# Patient Record
Sex: FEMALE | Race: WHITE
[De-identification: names, ages, dates, MRNs, and addresses within clinical notes are randomized per-mention and may not be internally consistent; named-entity substitution may affect disease eponyms.]

---

## 2020-06-25 NOTE — ED
Abdominal Pain HPI





- General


Chief Complaint: Abdominal Pain


Stated Complaint: vaginal pain


Time Seen by Provider: 20 17:13


Source: patient


Mode of arrival: wheelchair


Limitations: physical limitation





- History of Present Illness


Initial Comments: 





Patient is a 44-year-old female, with history of hypertension, presenting to the

emergency Department with complaints of lower abdominal cramping as well as 

vaginal bleeding.  Patient states she went to [facility] 

yesterday and was evaluated for heavy vaginal bleeding.  They did perform an 

ultrasound and blood work and showed no acute findings.  She was referred to 

OB/GYN.  Patient states the bleeding has seemed to decrease but she is having 

some lower abdominal cramping.  She states she does get cramping with her normal

menstrual cycles.  She states she was not sure exactly what was seen on 

ultrasound yesterday was concerned she might have some cysts.  She denies any 

fever, chills.  She denies being pregnant this time secondary a tubal ligation. 

She denies any chest pain or shortness of breath.  She has no further complaints

at this time.  Upon arrival to the ER her vital signs are stable.





- Related Data


                                Home Medications











 Medication  Instructions  Recorded  Confirmed


 


Citalopram Hydrobromide 40 mg PO DAILY 20





[Citalopram HBr]   


 


Cyclobenzaprine [Flexeril] 10 mg PO DAILY PRN 20


 


Ibuprofen 800 mg PO Q8H PRN 20


 


Lisinopril-Hctz 20-12.5 mg 1 tab PO DAILY 20





[Zestoretic 20-12.5]   








                                  Previous Rx's











 Medication  Instructions  Recorded


 


Ketorolac [Toradol] 10 mg PO Q8HR #10 tab 20











                                    Allergies











Allergy/AdvReac Type Severity Reaction Status Date / Time


 


amoxicillin Allergy  Rash/Hives Verified 20 18:26


 


Penicillins Allergy  Rash/Hives Verified 20 18:26


 


strawberry Allergy  Swelling Verified 20 18:26














Review of Systems


ROS Statement: 


Those systems with pertinent positive or pertinent negative responses have been 

documented in the HPI.





ROS Other: All systems not noted in ROS Statement are negative.





Past Medical History


Past Medical History: Hypertension


Additional Past Medical History / Comment(s): brain shunt - pseudo tumor


History of Any Multi-Drug Resistant Organisms: MRSA


Date of last positivie culture/infection: 


MDRO Source:: rt axilla


Past Surgical History:  Section, Cholecystectomy, Tubal Ligation


Additional Past Surgical History / Comment(s): brain - tumor


Past Psychological History: No Psychological Hx Reported


Smoking Status: Current every day smoker


Past Alcohol Use History: None Reported


Past Drug Use History: None Reported





General Exam





- General Exam Comments


Initial Comments: 





GENERAL: 


Well-appearing, well-nourished and in no acute distress.





HEAD: 


Atraumatic, normocephalic.





EYES:


Pupils equal round and reactive to light, extraocular movements intact, sclera 

anicteric, conjunctiva are normal.





ENT: 


TMs normal, nares patent, oropharynx clear without exudates.  Moist mucous 

membranes.





NECK: 


Normal range of motion, supple without lymphadenopathy or JVD.





LUNGS:


 Breath sounds clear to auscultation bilaterally and equal.  No wheezes rales or

 rhonchi.





HEART:


Regular rate and rhythm without murmurs, rubs or gallops.





ABDOMEN: 


Mild suprapubic tenderness on palpation.  No other abdominal pain.  Soft, 

normoactive bowel sounds.  No guarding, no rebound.  No masses appreciated.





: Deferred 





EXTREMITIES: 


Normal range of motion, no pitting or edema.  No clubbing or cyanosis.





NEUROLOGICAL: 


Cranial nerves II through XII grossly intact.  Normal speech, normal gait.





PSYCH:


Normal mood, normal affect.





SKIN:


 Warm, Dry, normal turgor, no rashes or lesions noted.


Limitations: physical limitation





Course


                                   Vital Signs











  20





  16:56 17:00 18:00


 


Temperature 98.9 F  


 


Pulse Rate 77  


 


Respiratory 18 20 20





Rate   


 


Blood Pressure 156/73  


 


O2 Sat by Pulse 96  





Oximetry   














  20





  19:19


 


Temperature 


 


Pulse Rate 73


 


Respiratory 18





Rate 


 


Blood Pressure 150/87


 


O2 Sat by Pulse 97





Oximetry 














Medical Decision Making





- Medical Decision Making





Patient is a 44-year-old female presenting for vaginal bleeding as well as 

suprapubic cramping 2 days.  Patient was seen at [facility] 

yesterday for same complaint.  They did do an ultrasound which showed no acute 

findings as well as lab work and a urine also showed no acute findings.  I did 

obtain these records and they have been reviewed.  Patient's vital signs today 

are stable.  Her exam just shows some very mild suprapubic tenderness on 

palpation.  She states her bleeding has improved today.  I did repeat her lab 

work and her hemoglobin is stable, no other acute findings.  Her urine showed no

 signs of infection at this time, hCG is not detected.  I did give patient 

Toradol which improved her symptoms.  I discussed these findings with the 

patient.  I recommended follow-up with OB/GYN.  This is most likely d

ysfunctional bleeding.  Patient is in agreement with this plan of care.  Return 

parameters were discussed with the patient she verbalized understanding.  Case 

discussed with Dr. Major. 





- Lab Data


Result diagrams: 


                                 20 17:43





                                 20 17:43


                                   Lab Results











  20 Range/Units





  17:43 17:43 18:13 


 


WBC  7.8    (3.8-10.6)  k/uL


 


RBC  4.27    (3.80-5.40)  m/uL


 


Hgb  11.8    (11.4-16.0)  gm/dL


 


Hct  36.0    (34.0-46.0)  %


 


MCV  84.4    (80.0-100.0)  fL


 


MCH  27.5    (25.0-35.0)  pg


 


MCHC  32.6    (31.0-37.0)  g/dL


 


RDW  16.7 H    (11.5-15.5)  %


 


Plt Count  210    (150-450)  k/uL


 


Neutrophils %  67    %


 


Lymphocytes %  21    %


 


Monocytes %  7    %


 


Eosinophils %  4    %


 


Basophils %  0    %


 


Neutrophils #  5.2    (1.3-7.7)  k/uL


 


Lymphocytes #  1.6    (1.0-4.8)  k/uL


 


Monocytes #  0.5    (0-1.0)  k/uL


 


Eosinophils #  0.3    (0-0.7)  k/uL


 


Basophils #  0.0    (0-0.2)  k/uL


 


Anisocytosis  Slight    


 


Sodium   138   (137-145)  mmol/L


 


Potassium   3.7   (3.5-5.1)  mmol/L


 


Chloride   106   ()  mmol/L


 


Carbon Dioxide   27   (22-30)  mmol/L


 


Anion Gap   5   mmol/L


 


BUN   12   (7-17)  mg/dL


 


Creatinine   0.60   (0.52-1.04)  mg/dL


 


Est GFR (CKD-EPI)AfAm   >90   (>60 ml/min/1.73 sqM)  


 


Est GFR (CKD-EPI)NonAf   >90   (>60 ml/min/1.73 sqM)  


 


Glucose   110 H   (74-99)  mg/dL


 


Calcium   9.0   (8.4-10.2)  mg/dL


 


Total Bilirubin   0.2   (0.2-1.3)  mg/dL


 


AST   20   (14-36)  U/L


 


ALT   13   (4-34)  U/L


 


Alkaline Phosphatase   71   ()  U/L


 


Total Protein   6.3   (6.3-8.2)  g/dL


 


Albumin   3.6   (3.5-5.0)  g/dL


 


Urine Color    Light Red  


 


Urine Appearance    Clear  (Clear)  


 


Urine pH    5.5  (5.0-8.0)  


 


Ur Specific Gravity    1.017  (1.001-1.035)  


 


Urine Protein    Trace H  (Negative)  


 


Urine Glucose (UA)    Negative  (Negative)  


 


Urine Ketones    Negative  (Negative)  


 


Urine Blood    Large H  (Negative)  


 


Urine Nitrite    Negative  (Negative)  


 


Urine Bilirubin    Negative  (Negative)  


 


Urine Urobilinogen    <2.0  (<2.0)  mg/dL


 


Ur Leukocyte Esterase    Small H  (Negative)  


 


Urine RBC    >182 H  (0-5)  /hpf


 


Urine WBC    38 H  (0-5)  /hpf


 


Urine Mucus    Rare H  (None)  /hpf


 


Urine HCG, Qual     (Not Detectd)  














  20 Range/Units





  18:13 


 


WBC   (3.8-10.6)  k/uL


 


RBC   (3.80-5.40)  m/uL


 


Hgb   (11.4-16.0)  gm/dL


 


Hct   (34.0-46.0)  %


 


MCV   (80.0-100.0)  fL


 


MCH   (25.0-35.0)  pg


 


MCHC   (31.0-37.0)  g/dL


 


RDW   (11.5-15.5)  %


 


Plt Count   (150-450)  k/uL


 


Neutrophils %   %


 


Lymphocytes %   %


 


Monocytes %   %


 


Eosinophils %   %


 


Basophils %   %


 


Neutrophils #   (1.3-7.7)  k/uL


 


Lymphocytes #   (1.0-4.8)  k/uL


 


Monocytes #   (0-1.0)  k/uL


 


Eosinophils #   (0-0.7)  k/uL


 


Basophils #   (0-0.2)  k/uL


 


Anisocytosis   


 


Sodium   (137-145)  mmol/L


 


Potassium   (3.5-5.1)  mmol/L


 


Chloride   ()  mmol/L


 


Carbon Dioxide   (22-30)  mmol/L


 


Anion Gap   mmol/L


 


BUN   (7-17)  mg/dL


 


Creatinine   (0.52-1.04)  mg/dL


 


Est GFR (CKD-EPI)AfAm   (>60 ml/min/1.73 sqM)  


 


Est GFR (CKD-EPI)NonAf   (>60 ml/min/1.73 sqM)  


 


Glucose   (74-99)  mg/dL


 


Calcium   (8.4-10.2)  mg/dL


 


Total Bilirubin   (0.2-1.3)  mg/dL


 


AST   (14-36)  U/L


 


ALT   (4-34)  U/L


 


Alkaline Phosphatase   ()  U/L


 


Total Protein   (6.3-8.2)  g/dL


 


Albumin   (3.5-5.0)  g/dL


 


Urine Color   


 


Urine Appearance   (Clear)  


 


Urine pH   (5.0-8.0)  


 


Ur Specific Gravity   (1.001-1.035)  


 


Urine Protein   (Negative)  


 


Urine Glucose (UA)   (Negative)  


 


Urine Ketones   (Negative)  


 


Urine Blood   (Negative)  


 


Urine Nitrite   (Negative)  


 


Urine Bilirubin   (Negative)  


 


Urine Urobilinogen   (<2.0)  mg/dL


 


Ur Leukocyte Esterase   (Negative)  


 


Urine RBC   (0-5)  /hpf


 


Urine WBC   (0-5)  /hpf


 


Urine Mucus   (None)  /hpf


 


Urine HCG, Qual  Not Detected  (Not Detectd)  














Disposition


Clinical Impression: 


 Dysfunctional uterine bleeding





Disposition: HOME SELF-CARE


Condition: Stable


Instructions (If sedation given, give patient instructions):  Dysfunctional Uter

ine Bleeding (ED)


Additional Instructions: 


Please return to the Emergency Department if symptoms worsen or any other 

concerns.


Follow-up with OB/GYN as discussed.  Trial of Toradol for discomfort.


Prescriptions: 


Ketorolac [Toradol] 10 mg PO Q8HR #10 tab


Is patient prescribed a controlled substance at d/c from ED?: No


Referrals: 


Sasha Benjamin MD [Primary Care Provider] - 1-2 days

## 2020-09-29 NOTE — P.GSHP
History of Present Illness


H&P Date: 20


Chief Complaint: Incisional hernia





This a 45-year-old female who's developed an incisional hernia at a previous 

periumbilical incision.  Patient developed a tender mass in the area.  She is 

presumed have incarcerated omentum within the mass.  She presents today for 

laparoscopic robotic-assisted repair.





Past Medical History


Past Medical History: COPD, CVA/TIA, Hypertension, Pneumonia, Sleep 

Apnea/CPAP/BIPAP


Additional Past Medical History / Comment(s): has brain shunt - pseudo tumor, 

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

pneumonia in 2020,uses CPAP, 2 TIA's in past-no residual effects, thinks 

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


History of Any Multi-Drug Resistant Organisms: MRSA


Date of last positivie culture/infection: 


MDRO Source:: rt axilla


Past Surgical History:  Section, Cholecystectomy, Tubal Ligation


Additional Past Surgical History / Comment(s):  shunt, colonoscopy, EGD


Past Anesthesia/Blood Transfusion Reactions: No Reported Reaction


Smoking Status: Current every day smoker





- Past Family History


  ** Mother


Family Medical History: Cancer





Medications and Allergies


                                Home Medications











 Medication  Instructions  Recorded  Confirmed  Type


 


Citalopram Hydrobromide 40 mg PO DAILY 20 History





[Citalopram HBr]    


 


Ibuprofen 800 mg PO Q8H PRN 20 History


 


Lisinopril-Hctz 20-12.5 mg 1 tab PO BID 20 History





[Zestoretic 20-12.5]    


 


Albuterol Inhaler [Ventolin Hfa 2 puff INHALATION RT-QID PRN 20 

History





Inhaler]    


 


Nicotine 14Mg/24Hr Patch [Habitrol 1 patch TRANSDERM DAILY 20 

History





14Mg/24Hr Patch]    








                                    Allergies











Allergy/AdvReac Type Severity Reaction Status Date / Time


 


amoxicillin Allergy  Rash/Hives Verified 20 07:59


 


bee venom protein (honey bee) Allergy  Anaphylaxis Verified 20 07:59


 


Penicillins Allergy  Rash/Hives Verified 20 07:59


 


strawberry Allergy  Swelling Verified 20 07:59














Surgical - Exam


                                   Vital Signs











Temp Pulse Resp BP Pulse Ox


 


 97.5 F L  80   16   140/61   94 L


 


 20 08:15  20 08:15  20 08:15  20 08:15  20 08:15














- General


well developed, well nourished, no distress





- Eyes


PERRL





- ENT


normal pinna





- Neck


no masses





- Respiratory


normal expansion





- Cardiovascular


Rhythm: regular





- Abdomen


Abdomen: soft, non tender


Hernia: incisional (4 cm incarcerated incisional hernia in the infra umbilical 

position)





Assessment and Plan


Assessment: 





Incisional hernia.  We'll perform laparoscopic robotic-assisted repair.

## 2021-07-23 ENCOUNTER — HOSPITAL ENCOUNTER (OUTPATIENT)
Dept: HOSPITAL 47 - RADCTMAIN | Age: 46
Discharge: HOME | End: 2021-07-23
Attending: RADIOLOGY
Payer: MEDICARE

## 2021-07-23 DIAGNOSIS — C75.4: Primary | ICD-10-CM

## 2021-07-23 DIAGNOSIS — Z92.3: ICD-10-CM

## 2021-07-23 DIAGNOSIS — D44.7: ICD-10-CM

## 2021-07-23 PROCEDURE — 70450 CT HEAD/BRAIN W/O DYE: CPT

## 2021-07-25 ENCOUNTER — HOSPITAL ENCOUNTER (EMERGENCY)
Dept: HOSPITAL 47 - EC | Age: 46
Discharge: HOME | End: 2021-07-25
Payer: MEDICARE

## 2021-07-25 VITALS — DIASTOLIC BLOOD PRESSURE: 86 MMHG | SYSTOLIC BLOOD PRESSURE: 190 MMHG | HEART RATE: 74 BPM | RESPIRATION RATE: 20 BRPM

## 2021-07-25 VITALS — TEMPERATURE: 98.3 F

## 2021-07-25 DIAGNOSIS — Z88.0: ICD-10-CM

## 2021-07-25 DIAGNOSIS — J44.9: ICD-10-CM

## 2021-07-25 DIAGNOSIS — M25.50: ICD-10-CM

## 2021-07-25 DIAGNOSIS — Z91.018: ICD-10-CM

## 2021-07-25 DIAGNOSIS — R53.83: ICD-10-CM

## 2021-07-25 DIAGNOSIS — Z79.899: ICD-10-CM

## 2021-07-25 DIAGNOSIS — F17.200: ICD-10-CM

## 2021-07-25 DIAGNOSIS — Z91.030: ICD-10-CM

## 2021-07-25 DIAGNOSIS — R53.1: Primary | ICD-10-CM

## 2021-07-25 DIAGNOSIS — I10: ICD-10-CM

## 2021-07-25 LAB
ALBUMIN SERPL-MCNC: 3.5 G/DL (ref 3.5–5)
ALP SERPL-CCNC: 76 U/L (ref 38–126)
ALT SERPL-CCNC: 17 U/L (ref 4–34)
ANION GAP SERPL CALC-SCNC: 4 MMOL/L
APTT BLD: 20.7 SEC (ref 22–30)
AST SERPL-CCNC: 29 U/L (ref 14–36)
BASOPHILS # BLD AUTO: 0 K/UL (ref 0–0.2)
BASOPHILS NFR BLD AUTO: 0 %
BUN SERPL-SCNC: 11 MG/DL (ref 7–17)
CALCIUM SPEC-MCNC: 8.9 MG/DL (ref 8.4–10.2)
CHLORIDE SERPL-SCNC: 104 MMOL/L (ref 98–107)
CO2 SERPL-SCNC: 30 MMOL/L (ref 22–30)
EOSINOPHIL # BLD AUTO: 0.2 K/UL (ref 0–0.7)
EOSINOPHIL NFR BLD AUTO: 4 %
ERYTHROCYTE [DISTWIDTH] IN BLOOD BY AUTOMATED COUNT: 4.07 M/UL (ref 3.8–5.4)
ERYTHROCYTE [DISTWIDTH] IN BLOOD: 16.6 % (ref 11.5–15.5)
GLUCOSE SERPL-MCNC: 173 MG/DL (ref 74–99)
HCT VFR BLD AUTO: 32.7 % (ref 34–46)
HGB BLD-MCNC: 10.7 GM/DL (ref 11.4–16)
INR PPP: 0.9 (ref ?–1.2)
LYMPHOCYTES # SPEC AUTO: 1.2 K/UL (ref 1–4.8)
LYMPHOCYTES NFR SPEC AUTO: 18 %
MCH RBC QN AUTO: 26.2 PG (ref 25–35)
MCHC RBC AUTO-ENTMCNC: 32.6 G/DL (ref 31–37)
MCV RBC AUTO: 80.3 FL (ref 80–100)
MONOCYTES # BLD AUTO: 0.4 K/UL (ref 0–1)
MONOCYTES NFR BLD AUTO: 6 %
NEUTROPHILS # BLD AUTO: 4.5 K/UL (ref 1.3–7.7)
NEUTROPHILS NFR BLD AUTO: 70 %
PH UR: 5.5 [PH] (ref 5–8)
PLATELET # BLD AUTO: 219 K/UL (ref 150–450)
POTASSIUM SERPL-SCNC: 4 MMOL/L (ref 3.5–5.1)
PROT SERPL-MCNC: 6 G/DL (ref 6.3–8.2)
PT BLD: 9.5 SEC (ref 9–12)
RBC UR QL: 1 /HPF (ref 0–5)
SODIUM SERPL-SCNC: 138 MMOL/L (ref 137–145)
SP GR UR: 1.01 (ref 1–1.03)
SQUAMOUS UR QL AUTO: 1 /HPF (ref 0–4)
UROBILINOGEN UR QL STRIP: <2 MG/DL (ref ?–2)
WBC # BLD AUTO: 6.4 K/UL (ref 3.8–10.6)
WBC # UR AUTO: 3 /HPF (ref 0–5)

## 2021-07-25 PROCEDURE — 81001 URINALYSIS AUTO W/SCOPE: CPT

## 2021-07-25 PROCEDURE — 85730 THROMBOPLASTIN TIME PARTIAL: CPT

## 2021-07-25 PROCEDURE — 83605 ASSAY OF LACTIC ACID: CPT

## 2021-07-25 PROCEDURE — 36415 COLL VENOUS BLD VENIPUNCTURE: CPT

## 2021-07-25 PROCEDURE — 96360 HYDRATION IV INFUSION INIT: CPT

## 2021-07-25 PROCEDURE — 85610 PROTHROMBIN TIME: CPT

## 2021-07-25 PROCEDURE — 99285 EMERGENCY DEPT VISIT HI MDM: CPT

## 2021-07-25 PROCEDURE — 85025 COMPLETE CBC W/AUTO DIFF WBC: CPT

## 2021-07-25 PROCEDURE — 80053 COMPREHEN METABOLIC PANEL: CPT

## 2021-07-25 PROCEDURE — 84484 ASSAY OF TROPONIN QUANT: CPT

## 2021-07-25 PROCEDURE — 93005 ELECTROCARDIOGRAM TRACING: CPT

## 2021-07-25 PROCEDURE — 71046 X-RAY EXAM CHEST 2 VIEWS: CPT

## 2021-07-25 NOTE — XR
EXAMINATION TYPE: XR chest 2V

 

DATE OF EXAM: 7/25/2021

 

COMPARISON: 4/1/2013

 

HISTORY: Cough

 

TECHNIQUE: 2 views

 

FINDINGS: Heart and mediastinum are normal. Lungs are clear. Diaphragm is normal. There is right jugu
lar catheter with tip in the superior vena cava.

 

IMPRESSION: No active cardiopulmonary disease. Normal heart. No change.

## 2021-07-25 NOTE — CT
EXAMINATION TYPE: CT brain wo con

 

DATE OF EXAM: 7/23/2021

 

COMPARISON: None

 

HISTORY: checking for shunt placement

 

CT DLP: 1133.3 mGycm

 

Unenhanced CT of the brain was performed.  

 

The basal cisterns and sulci overlying the cerebral convexities demonstrate a normal appearance.  Rig
ht frontal shunt catheter is noted with its distal tip within the left lateral ventricle. The left la
teral ventricle is larger than its right-sided counterpart.

 

There is no evidence for intracranial hemorrhage or sulcal effacement.  

 

No mass effects are seen.  

 

Osseous calvarium is intact.

 

If symptoms persist consider MRI as clinically warranted.  

 

IMPRESSION:

 

1.  Right frontal shunt catheter is noted with its distal tip within the left lateral ventricle. The 
left lateral ventricle is larger than its right-sided counterpart.

## 2021-07-25 NOTE — ED
General Adult HPI





- General


Chief complaint: Weakness


Stated complaint: Weakness


Time Seen by Provider: 21 15:46


Source: patient, RN notes reviewed


Mode of arrival: ambulatory





- History of Present Illness


Initial comments: 





Patient is a 46-year-old female that presents to the emergency department 

complaining of generalized not feeling well for the last 2 weeks.  She notes 

that all of her joints feel achy.  She denied any current pain just not feeling 

well.  She was otherwise a well-appearing 46-year-old female in no apparent 

distress or pain.  She notes that she might be prediabetic.  She denied any 

other health comorbidities.  She denied any chest pain shortness of breath 

headache nausea vomiting diarrhea constipation fever fatigue chills.





- Related Data


                                Home Medications











 Medication  Instructions  Recorded  Confirmed


 


Citalopram Hydrobromide 40 mg PO DAILY 20





[Citalopram HBr]   


 


Ibuprofen 800 mg PO Q8H PRN 20


 


Lisinopril-Hctz 20-12.5 mg 1 tab PO BID 20





[Zestoretic 20-12.5]   


 


Albuterol Inhaler [Ventolin Hfa 2 puff INHALATION RT-QID PRN 20





Inhaler]   


 


Cyanocobalamin (Vitamin B-12) 1,000 mcg PO DAILY 21





[Vitamin B-12]   


 


Ergocalciferol [Vitamin D2 (1250 1,250 mcg PO Q14D 21





Mcg = 40106 Iu)]   


 


Metoprolol Tartrate [Lopressor] 25 mg PO DAILY@1500 21


 


Pantoprazole Sodium [Protonix] 40 mg PO DAILY PRN 21


 


traMADol HCL 50 - 100 mg PO TID PRN 21











                                    Allergies











Allergy/AdvReac Type Severity Reaction Status Date / Time


 


amoxicillin Allergy  Anaphylaxis Verified 21 17:18


 


bee venom protein (honey bee) Allergy  Anaphylaxis Verified 21 17:18


 


Penicillins Allergy  Anaphylaxis Verified 21 17:18


 


strawberry Allergy  Anaphylaxis Verified 21 17:18














Review of Systems


ROS Statement: 


Those systems with pertinent positive or pertinent negative responses have been 

documented in the HPI.





ROS Other: All systems not noted in ROS Statement are negative.





Past Medical History


Past Medical History: COPD, CVA/TIA, Hypertension, Pneumonia, Sleep 

Apnea/CPAP/BIPAP


Additional Past Medical History / Comment(s): has brain shunt - pseudo tumor, 

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

 pneumonia in 2020,uses CPAP, 2 TIA's in past-no residual effects, thinks 

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


History of Any Multi-Drug Resistant Organisms: MRSA


Date of last positivie culture/infection: 


MDRO Source:: rt axilla


Past Surgical History:  Section, Cholecystectomy, Tubal Ligation


Additional Past Surgical History / Comment(s):  shunt, colonoscopy, EGD


Past Anesthesia/Blood Transfusion Reactions: No Reported Reaction


Past Psychological History: Anxiety, Depression


Smoking Status: Current every day smoker


Past Alcohol Use History: Rare


Past Drug Use History: None Reported





- Past Family History


  ** Mother


Family Medical History: Cancer





General Exam


Limitations: no limitations


General appearance: alert, in no apparent distress, obese (Morbidly)


Head exam: Present: atraumatic, normocephalic, normal inspection


Eye exam: Present: normal appearance, PERRL, EOMI.  Absent: scleral icterus, 

conjunctival injection, periorbital swelling


Neck exam: Present: normal inspection


Respiratory exam: Present: normal lung sounds bilaterally.  Absent: respiratory 

distress, wheezes, rales, rhonchi, stridor


Cardiovascular Exam: Present: regular rate, normal rhythm, normal heart sounds. 

 Absent: systolic murmur, diastolic murmur, rubs, gallop, clicks


GI/Abdominal exam: Present: soft, normal bowel sounds.  Absent: distended, 

tenderness, guarding, rebound, rigid


Extremities exam: Present: normal inspection, full ROM, normal capillary refill.

  Absent: tenderness, pedal edema, joint swelling, calf tenderness


Neurological exam: Present: alert, oriented X3


Psychiatric exam: Present: normal affect, normal mood


Skin exam: Present: warm, dry, intact, normal color.  Absent: rash





Course


                                   Vital Signs











  21





  15:39 17:21


 


Temperature 98.3 F 


 


Pulse Rate 80 74


 


Respiratory 18 20





Rate  


 


Blood Pressure 191/82 190/86


 


O2 Sat by Pulse 94 L 94 L





Oximetry  














EKG Findings





- EKG Comments:


EKG Findings:: Ventricular rate 77 bpm, ME interval 166 ms, QRS duration 100 ms,

  ms, PRT axes 46/39/48.  Normal sinus rhythm.





Medical Decision Making





- Medical Decision Making





Patient is a 46-year-old female who presents for a 2 week history of not feeling

 well and feeling fatigued.


Labs, EKG, cardiac monitor, chest x-ray, 1 L normal saline ordered.





- Lab Data


Result diagrams: 


                                 21 16:26





                                 21 16:26


                                   Lab Results











  21 Range/Units





  16:26 16:26 16:26 


 


WBC  6.4    (3.8-10.6)  k/uL


 


RBC  4.07    (3.80-5.40)  m/uL


 


Hgb  10.7 L    (11.4-16.0)  gm/dL


 


Hct  32.7 L    (34.0-46.0)  %


 


MCV  80.3    (80.0-100.0)  fL


 


MCH  26.2    (25.0-35.0)  pg


 


MCHC  32.6    (31.0-37.0)  g/dL


 


RDW  16.6 H    (11.5-15.5)  %


 


Plt Count  219    (150-450)  k/uL


 


MPV  8.9    


 


Neutrophils %  70    %


 


Lymphocytes %  18    %


 


Monocytes %  6    %


 


Eosinophils %  4    %


 


Basophils %  0    %


 


Neutrophils #  4.5    (1.3-7.7)  k/uL


 


Lymphocytes #  1.2    (1.0-4.8)  k/uL


 


Monocytes #  0.4    (0-1.0)  k/uL


 


Eosinophils #  0.2    (0-0.7)  k/uL


 


Basophils #  0.0    (0-0.2)  k/uL


 


Hypochromasia  Slight    


 


Anisocytosis  Slight    


 


PT   9.5   (9.0-12.0)  sec


 


INR   0.9   (<1.2)  


 


APTT   20.7 L   (22.0-30.0)  sec


 


Sodium     (137-145)  mmol/L


 


Potassium     (3.5-5.1)  mmol/L


 


Chloride     ()  mmol/L


 


Carbon Dioxide     (22-30)  mmol/L


 


Anion Gap     mmol/L


 


BUN     (7-17)  mg/dL


 


Creatinine     (0.52-1.04)  mg/dL


 


Est GFR (CKD-EPI)AfAm     (>60 ml/min/1.73 sqM)  


 


Est GFR (CKD-EPI)NonAf     (>60 ml/min/1.73 sqM)  


 


Glucose     (74-99)  mg/dL


 


Plasma Lactic Acid Karl     (0.7-2.0)  mmol/L


 


Calcium     (8.4-10.2)  mg/dL


 


Total Bilirubin     (0.2-1.3)  mg/dL


 


AST     (14-36)  U/L


 


ALT     (4-34)  U/L


 


Alkaline Phosphatase     ()  U/L


 


Troponin I     (0.000-0.034)  ng/mL


 


Total Protein     (6.3-8.2)  g/dL


 


Albumin     (3.5-5.0)  g/dL


 


Urine Color    Yellow  


 


Urine Appearance    Clear  (Clear)  


 


Urine pH    5.5  (5.0-8.0)  


 


Ur Specific Gravity    1.012  (1.001-1.035)  


 


Urine Protein    Negative  (Negative)  


 


Urine Glucose (UA)    Negative  (Negative)  


 


Urine Ketones    Negative  (Negative)  


 


Urine Blood    Negative  (Negative)  


 


Urine Nitrite    Negative  (Negative)  


 


Urine Bilirubin    Negative  (Negative)  


 


Urine Urobilinogen    <2.0  (<2.0)  mg/dL


 


Ur Leukocyte Esterase    Small H  (Negative)  


 


Urine RBC    1  (0-5)  /hpf


 


Urine WBC    3  (0-5)  /hpf


 


Ur Squamous Epith Cells    1  (0-4)  /hpf


 


Urine Bacteria    Occasional H  (None)  /hpf














  21 Range/Units





  16:26 16:26 16:26 


 


WBC     (3.8-10.6)  k/uL


 


RBC     (3.80-5.40)  m/uL


 


Hgb     (11.4-16.0)  gm/dL


 


Hct     (34.0-46.0)  %


 


MCV     (80.0-100.0)  fL


 


MCH     (25.0-35.0)  pg


 


MCHC     (31.0-37.0)  g/dL


 


RDW     (11.5-15.5)  %


 


Plt Count     (150-450)  k/uL


 


MPV     


 


Neutrophils %     %


 


Lymphocytes %     %


 


Monocytes %     %


 


Eosinophils %     %


 


Basophils %     %


 


Neutrophils #     (1.3-7.7)  k/uL


 


Lymphocytes #     (1.0-4.8)  k/uL


 


Monocytes #     (0-1.0)  k/uL


 


Eosinophils #     (0-0.7)  k/uL


 


Basophils #     (0-0.2)  k/uL


 


Hypochromasia     


 


Anisocytosis     


 


PT     (9.0-12.0)  sec


 


INR     (<1.2)  


 


APTT     (22.0-30.0)  sec


 


Sodium  138    (137-145)  mmol/L


 


Potassium  4.0    (3.5-5.1)  mmol/L


 


Chloride  104    ()  mmol/L


 


Carbon Dioxide  30    (22-30)  mmol/L


 


Anion Gap  4    mmol/L


 


BUN  11    (7-17)  mg/dL


 


Creatinine  0.54    (0.52-1.04)  mg/dL


 


Est GFR (CKD-EPI)AfAm  >90    (>60 ml/min/1.73 sqM)  


 


Est GFR (CKD-EPI)NonAf  >90    (>60 ml/min/1.73 sqM)  


 


Glucose  173 H    (74-99)  mg/dL


 


Plasma Lactic Acid Karl   1.4   (0.7-2.0)  mmol/L


 


Calcium  8.9    (8.4-10.2)  mg/dL


 


Total Bilirubin  0.2    (0.2-1.3)  mg/dL


 


AST  29    (14-36)  U/L


 


ALT  17    (4-34)  U/L


 


Alkaline Phosphatase  76    ()  U/L


 


Troponin I    <0.012  (0.000-0.034)  ng/mL


 


Total Protein  6.0 L    (6.3-8.2)  g/dL


 


Albumin  3.5    (3.5-5.0)  g/dL


 


Urine Color     


 


Urine Appearance     (Clear)  


 


Urine pH     (5.0-8.0)  


 


Ur Specific Gravity     (1.001-1.035)  


 


Urine Protein     (Negative)  


 


Urine Glucose (UA)     (Negative)  


 


Urine Ketones     (Negative)  


 


Urine Blood     (Negative)  


 


Urine Nitrite     (Negative)  


 


Urine Bilirubin     (Negative)  


 


Urine Urobilinogen     (<2.0)  mg/dL


 


Ur Leukocyte Esterase     (Negative)  


 


Urine RBC     (0-5)  /hpf


 


Urine WBC     (0-5)  /hpf


 


Ur Squamous Epith Cells     (0-4)  /hpf


 


Urine Bacteria     (None)  /hpf














- EKG Data


-: EKG Interpreted by Me


EKG shows normal: sinus rhythm


Rate: normal


EKG Comments: 





Ventricular rate 77 bpm, ME interval 166 ms, QRS duration 100 ms,  ms, 

PRT axes 46/39/48.  Normal sinus rhythm.





- Radiology Data


Radiology results: report reviewed, image reviewed





Chest x-ray: No acute cardiopulmonary process.  Normal chest.  No change.





Disposition


Clinical Impression: 


 Fatigue, Weakness





Disposition: HOME SELF-CARE


Condition: Stable


Instructions (If sedation given, give patient instructions):  Weakness (ED), 

Fatigue (ED)


Additional Instructions: 


Please return to the Emergency Department if symptoms worsen or any other 

concerns.


Follow-up primary care in the next several days.


Take Tylenol and Motrin as needed for pain control.





Is patient prescribed a controlled substance at d/c from ED?: No


Referrals: 


Sasha Benjamin MD [Primary Care Provider] - 1-2 days


Time of Disposition: 17:29

## 2021-08-23 ENCOUNTER — HOSPITAL ENCOUNTER (OUTPATIENT)
Dept: HOSPITAL 47 - RADCTMAIN | Age: 46
Discharge: HOME | End: 2021-08-23
Attending: INTERNAL MEDICINE
Payer: MEDICARE

## 2021-08-23 DIAGNOSIS — R16.2: ICD-10-CM

## 2021-08-23 DIAGNOSIS — R91.1: Primary | ICD-10-CM

## 2021-08-23 PROCEDURE — 71260 CT THORAX DX C+: CPT

## 2021-08-23 NOTE — CT
EXAMINATION TYPE: CT chest w con

 

DATE OF EXAM: 8/23/2021

 

COMPARISON: Radiographs 7/25/2021

 

HISTORY: 46-year-old female R91.1, lung nodule

 

TECHNIQUE: Contiguous axial scanning of the chest after the administration of 100 mL of Isovue 300.  
Coronal/sagittal reconstructions performed.

 

CT DLP: 1070.5mGycm. Automatic exposure control utilized for a dose reduction.

 

 

FINDINGS:

Right-sided  shunt catheter courses down beyond the field-of-view.

 

The heart is borderline enlarged. Trace basilar pericardial fluid.

 

Ectatic ascending aorta 3.9 cm. Conventional arch vessel branching anatomy.

 

No thoracic lymphadenopathy by CT size criteria.

 

There is diffuse mosaic attenuation. No consolidation or pleural effusion.

 

Liver is enlarged. The inferior aspect is beyond the field of view. Cholecystectomy clips. Spleen enl
arged at 14.8 cm. Large patient body habitus. 

 

Bones: Moderate degenerative disc disease mid to lower thoracic spine with endplate irregularity and 
mild anterior endplate spondylosis.

 

 

 

IMPRESSION:  

 

1. Diffuse mosaic attenuation throughout the lungs. Findings can be seen with air trapping in the set
ting of small airways disease. Clinically correlate to exclude other inflammatory etiologies such as 
an interstitial pneumonitis (e.g. NSIP).

2. Hepatosplenomegaly. Clinically correlate.